# Patient Record
Sex: FEMALE | Race: WHITE | NOT HISPANIC OR LATINO | Employment: OTHER | URBAN - METROPOLITAN AREA
[De-identification: names, ages, dates, MRNs, and addresses within clinical notes are randomized per-mention and may not be internally consistent; named-entity substitution may affect disease eponyms.]

---

## 2021-11-29 ENCOUNTER — OFFICE VISIT (OUTPATIENT)
Dept: GASTROENTEROLOGY | Facility: CLINIC | Age: 81
End: 2021-11-29

## 2021-11-29 ENCOUNTER — LAB (OUTPATIENT)
Dept: LAB | Facility: HOSPITAL | Age: 81
End: 2021-11-29

## 2021-11-29 VITALS
WEIGHT: 114.2 LBS | HEART RATE: 79 BPM | TEMPERATURE: 97.8 F | DIASTOLIC BLOOD PRESSURE: 84 MMHG | SYSTOLIC BLOOD PRESSURE: 137 MMHG

## 2021-11-29 DIAGNOSIS — R11.0 NAUSEA: ICD-10-CM

## 2021-11-29 DIAGNOSIS — R10.10 PAIN OF UPPER ABDOMEN: ICD-10-CM

## 2021-11-29 DIAGNOSIS — R74.8 ELEVATED LIVER ENZYMES: ICD-10-CM

## 2021-11-29 DIAGNOSIS — R74.8 ELEVATED LIVER ENZYMES: Primary | ICD-10-CM

## 2021-11-29 DIAGNOSIS — R17 JAUNDICE: ICD-10-CM

## 2021-11-29 DIAGNOSIS — R50.9 FEVER, UNSPECIFIED FEVER CAUSE: ICD-10-CM

## 2021-11-29 PROCEDURE — 99204 OFFICE O/P NEW MOD 45 MIN: CPT | Performed by: NURSE PRACTITIONER

## 2021-11-29 RX ORDER — LEVOTHYROXINE SODIUM 88 UG/1
1 TABLET ORAL DAILY
COMMUNITY
Start: 2021-11-22

## 2021-11-29 RX ORDER — CLOPIDOGREL BISULFATE 75 MG/1
1 TABLET ORAL DAILY
COMMUNITY
Start: 2021-10-20

## 2021-11-29 RX ORDER — LISINOPRIL 5 MG/1
1 TABLET ORAL DAILY
COMMUNITY
Start: 2021-08-28

## 2021-11-29 RX ORDER — ASPIRIN 81 MG/1
81 TABLET ORAL DAILY
COMMUNITY

## 2021-11-29 NOTE — PROGRESS NOTES
GASTROENTEROLOGY OFFICE NOTE    Salma House  [id number]  1940    CARE TEAM  Patient Care Team:  Pauline Fuentes DO as PCP - General (Family Medicine)    Referring Provider: No ref. provider found    Chief Complaint   Patient presents with   • Abnormal Lab     Elevated Liver Transaminase levels         HISTORY OF PRESENT ILLNESS:   Salma House is a 81 y.o. female who presents to the clinic today for evaluation regarding elevated liver enzymes. She was referred by her PCP, Dr. Fuentes.Due to pain across the upper abdomen and pressure as well as nausea and difficulty breathing labs were ordered.  She had  negative Covid and influenza tests on 11/22/2021. 11/22/2021 H. pylori breath test negative; B12 greater than 1000; free T4 elevated at 2.50; TSH elevated at 13.70; CBC with normal white blood cell count, hemoglobin elevated at 15.4; hematocrit normal and platelet count normal 393; CMP with elevated bilirubin of 3.4, AST elevated at 540 ALT elevated at 325 and alkaline phosphatase elevated at 334.   She was informed to stop taking atorvastatin and avoid acetaminophen.  She quit atorvastatin approximately 7 days ago.  It was documented that amylase and lipase were added on to her labs and CT scan of the abdomen was ordered.  She reports she has received a voicemail to schedule the CT scan and while waiting in the office, she scheduled it for 12/3 at 7:00 at [facility].    She reports intermittent episodes of the right side abdominal pain and right upper quadrant pain that has recently become worse.  She reports pain radiates from the right side to the epigastric area to the left upper quadrant and is aggravated by eating.  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  She reports chills in the evenings.  She has nausea.  She reports recently noticing her urine was dark in color and has also experienced feeling short of air and cough with possible  abdominal swelling.  She reports when she was previously on atorvastatin it was discontinued due to an issue but she did not inform me of the details of this.     Past Medical History:   Diagnosis Date   • Graves disease    • Hypertension         Past Surgical History:   Procedure Laterality Date   • BLADDER SUSPENSION     • CHOLECYSTECTOMY     • COLONOSCOPY     • FOOT SURGERY Left    • HYSTERECTOMY     • TONSILLECTOMY     • TOTAL HIP ARTHROPLASTY Left         Current Outpatient Medications on File Prior to Visit   Medication Sig   • aspirin 81 MG EC tablet Take 81 mg by mouth Daily.   • clopidogrel (PLAVIX) 75 MG tablet Take 1 tablet by mouth Daily.   • levothyroxine (SYNTHROID, LEVOTHROID) 88 MCG tablet Take 1 tablet by mouth Daily.   • lisinopril (PRINIVIL,ZESTRIL) 5 MG tablet Take 1 tablet by mouth Daily.     No current facility-administered medications on file prior to visit.       No Known Allergies    Family History   Problem Relation Age of Onset   • Heart disease Mother    • Heart disease Father    • Colon polyps Neg Hx    • Colon cancer Neg Hx        Social History     Socioeconomic History   • Marital status: Unknown   Tobacco Use   • Smoking status: Current Every Day Smoker     Packs/day: 0.50   • Smokeless tobacco: Never Used   Vaping Use   • Vaping Use: Never used   Substance and Sexual Activity   • Alcohol use: Never   • Drug use: Never   • Sexual activity: Defer       PHYSICAL EXAM   /84 (BP Location: Left arm, Patient Position: Sitting, Cuff Size: Adult)   Pulse 79   Temp 97.8 °F (36.6 °C) (Temporal)   Wt 51.8 kg (114 lb 3.2 oz)   Physical Exam  Constitutional:       General: She is not in acute distress.  HENT:      Head: Normocephalic and atraumatic. No contusion.      Right Ear: External ear normal.      Left Ear: External ear normal.   Eyes:      General: Lids are normal. Scleral icterus present.         Right eye: No discharge.         Left eye: No discharge.      Extraocular  Movements: Extraocular movements intact.   Neck:      Trachea: Trachea normal.      Comments: No visible mass  No visible adenopathy  Cardiovascular:      Rate and Rhythm: Normal rate.   Pulmonary:      Effort: No respiratory distress.      Comments: Symmetrical expansion    Abdominal:      Palpations: Abdomen is soft. There is no mass.      Tenderness: There is abdominal tenderness in the right upper quadrant, epigastric area and left upper quadrant.   Musculoskeletal:      Right lower leg: No edema.      Left lower leg: No edema.      Comments: Symmetrical movement of upper extremities  Symmetrical movement of lower extremities  No visible deformities   Skin:     General: Skin is warm and dry.      Coloration: Skin is jaundiced.   Neurological:      General: No focal deficit present.      Mental Status: She is alert and oriented to person, place, and time.   Psychiatric:         Mood and Affect: Mood normal.         Behavior: Behavior normal.         Thought Content: Thought content normal.         Results Review:  negative Covid and influenza tests on 11/22/2021. 11/22/2021 H. pylori breath test negative; B12 greater than 1000; free T4 elevated at 2.50; TSH elevated at 13.70; CBC with normal white blood cell count, hemoglobin elevated at 15.4; hematocrit normal and platelet count normal 393; CMP with elevated bilirubin of 3.4, AST elevated at 540 ALT elevated at 325 and alkaline phosphatase elevated at 334.        ASSESSMENT / PLAN  1. Elevated liver enzymes, abdominal pain and nausea.  -recommend stat US. I believe this is scheduled for tomorrow. She also scheduled CT at [facility] 12/3/2021 but will await US result and lab results to determine if she needs CT and/or possible MRCP or ERCP. Due to noting jaundice and possible fever in the evenings, I discussed hospital admission but she reported she needed some time before she could be admitted to the hospital. I recommend she start preparing for possible admission.  Will try to review lab results and US result tomorrow and discuss findings with the patient.   Atorvastatin was recently discontinued by her PCP. I also wanted to order AFP, INR and hepatitis B studies but I received an error message that her insurance may not cover these tests so they were canceled. If abnormal findings on labs or imaging, will consider ordering these tests in the future.   - Alpha - 1 - Antitrypsin Phenotype; Future  - STARLA With / DsDNA, RNP, Sjogrens A / B, Lam; Future  - Anti-microsomal Antibody; Future  - Anti-Smooth Muscle Antibody Titer; Future  - CBC (No Diff); Future  - Comprehensive Metabolic Panel; Future  - Ferritin; Future  - HCV Antibody Rfx To Qnt PCR; Future  - Hemochromatosis Mutation; Future  - Hepatitis A Antibody, Total; Future  - Mitochondrial Antibodies, M2; Future  - Iron Profile; Future  - IgG; Future  - US Liver; Future    2. Jaundice  -see above  - US Liver; Future    3. Fever, unspecified fever cause  -I am concerned about possible cholangitis and discussed with the patient the possible need for inpatient care and treatment but she reported she needed some time before possible hospital admission. We discussed if any worsening symptoms, she needs to go to an ED. She demonstrated understanding.     Salma House  reports that she has been smoking. She has been smoking about 0.50 packs per day. She has never used smokeless tobacco..   I advised her to quit.  I spent 3  minutes counseling the patient.        I will plan to call the patient with US and lab results to determine next step.   Milagros Sylvester, APRN  11/29/2021

## 2021-11-30 ENCOUNTER — HOSPITAL ENCOUNTER (OUTPATIENT)
Dept: ULTRASOUND IMAGING | Facility: HOSPITAL | Age: 81
Discharge: HOME | End: 2021-11-30
Admitting: NURSE PRACTITIONER

## 2021-11-30 DIAGNOSIS — R74.8 ELEVATED LIVER ENZYMES: ICD-10-CM

## 2021-11-30 DIAGNOSIS — R17 JAUNDICE: ICD-10-CM

## 2021-11-30 PROCEDURE — 76705 ECHO EXAM OF ABDOMEN: CPT

## 2021-12-01 ENCOUNTER — TELEPHONE (OUTPATIENT)
Dept: GASTROENTEROLOGY | Facility: CLINIC | Age: 81
End: 2021-12-01

## 2021-12-01 DIAGNOSIS — R11.0 NAUSEA: ICD-10-CM

## 2021-12-01 DIAGNOSIS — K83.8 COMMON BILE DUCT DILATION: ICD-10-CM

## 2021-12-01 DIAGNOSIS — R10.10 PAIN OF UPPER ABDOMEN: ICD-10-CM

## 2021-12-01 DIAGNOSIS — R17 JAUNDICE: ICD-10-CM

## 2021-12-01 DIAGNOSIS — R74.8 ELEVATED LIVER ENZYMES: Primary | ICD-10-CM

## 2021-12-02 ENCOUNTER — TELEPHONE (OUTPATIENT)
Dept: GASTROENTEROLOGY | Facility: CLINIC | Age: 81
End: 2021-12-02

## 2021-12-02 LAB
A1AT PHENOTYP SERPL IFE: ABNORMAL
A1AT SERPL-MCNC: 225 MG/DL (ref 101–187)
ACTIN IGG SERPL-ACNC: 51 UNITS (ref 0–19)
ALBUMIN SERPL-MCNC: 4 G/DL (ref 3.7–4.7)
ALBUMIN/GLOB SERPL: 1.1 {RATIO} (ref 1.2–2.2)
ALP SERPL-CCNC: 318 IU/L (ref 44–121)
ALT SERPL-CCNC: 214 IU/L (ref 0–32)
ANA SER QL: NEGATIVE
AST SERPL-CCNC: 327 IU/L (ref 0–40)
BILIRUB SERPL-MCNC: 5.4 MG/DL (ref 0–1.2)
BUN SERPL-MCNC: 5 MG/DL (ref 8–27)
BUN/CREAT SERPL: 5 (ref 12–28)
CALCIUM SERPL-MCNC: 9.1 MG/DL (ref 8.7–10.3)
CHLORIDE SERPL-SCNC: 96 MMOL/L (ref 96–106)
CO2 SERPL-SCNC: 22 MMOL/L (ref 20–29)
CREAT SERPL-MCNC: 0.94 MG/DL (ref 0.57–1)
ERYTHROCYTE [DISTWIDTH] IN BLOOD BY AUTOMATED COUNT: 12.8 % (ref 11.7–15.4)
FERRITIN SERPL-MCNC: 616 NG/ML (ref 15–150)
GLOBULIN SER CALC-MCNC: 3.6 G/DL (ref 1.5–4.5)
GLUCOSE SERPL-MCNC: 83 MG/DL (ref 65–99)
HAV AB SER QL IA: POSITIVE
HCT VFR BLD AUTO: 43.1 % (ref 34–46.6)
HCV AB S/CO SERPL IA: <0.1 S/CO RATIO (ref 0–0.9)
HCV AB SERPL QL IA: NORMAL
HFE GENE MUT ANL BLD/T: NORMAL
HGB BLD-MCNC: 14.9 G/DL (ref 11.1–15.9)
IGG SERPL-MCNC: 1498 MG/DL (ref 586–1602)
IRON SATN MFR SERPL: 58 % (ref 15–55)
IRON SERPL-MCNC: 178 UG/DL (ref 27–139)
LKM-1 AB SER-ACNC: 1 UNITS (ref 0–20)
MCH RBC QN AUTO: 31.7 PG (ref 26.6–33)
MCHC RBC AUTO-ENTMCNC: 34.6 G/DL (ref 31.5–35.7)
MCV RBC AUTO: 92 FL (ref 79–97)
MITOCHONDRIA M2 IGG SER-ACNC: <20 UNITS (ref 0–20)
PLATELET # BLD AUTO: 376 X10E3/UL (ref 150–450)
POTASSIUM SERPL-SCNC: 3.6 MMOL/L (ref 3.5–5.2)
PROT SERPL-MCNC: 7.6 G/DL (ref 6–8.5)
RBC # BLD AUTO: 4.7 X10E6/UL (ref 3.77–5.28)
SODIUM SERPL-SCNC: 135 MMOL/L (ref 134–144)
TIBC SERPL-MCNC: 309 UG/DL (ref 250–450)
UIBC SERPL-MCNC: 131 UG/DL (ref 118–369)
WBC # BLD AUTO: 6.9 X10E3/UL (ref 3.4–10.8)

## 2021-12-02 NOTE — TELEPHONE ENCOUNTER
Patient called with complaints of worsening abdominal pain in the upper abdomen and radiates around to her back.  She is currently being evaluated for elevated liver enzymes and hyperbilirubinemia.  Bilirubin noted to have worsened and a 1 week timeframe from 3.4 to 5.4, MRCP is planned but next available appointment is not until Monday 12/6.  I discussed with the patient that with her complaints of pain increasing bilirubin she may have a blockage in her duct and this needs to be evaluated.  Until then, the etiology of her pain is not clear.  Currently, she states that her pain is tolerable but it seems to be worsening.  I recommended that she come to the ED if the pain continues to worsen.  She is agreeable.

## 2021-12-02 NOTE — TELEPHONE ENCOUNTER
Called and notified patient of the results as well as Milagros Sylvester's , Recommendations. Per the patient there is no reason she cant have a MRCP and is okay with moving forward with the imaging. The patient was also instructed to call her PCP office and confirm if they still want her to have the CT scheduled for tomorrow. Notified patient we will call her back today with a day and time for the MRCP.

## 2021-12-02 NOTE — TELEPHONE ENCOUNTER
Called patient back and notified her she is scheduled for 12/06/2021 arrival time at 11:15AM. Notified the patient if we can get her in for a earlier appointment we will contact her with a new day and time. Patient noted she is having moderate abdominal pain that she feels has become worse since her office visit , unable to eat anything, has nausea, feels very weak, and has only had a cup of coffee/glass of tea. Transferred patient to [facility] to further discuss her symptoms and recommendations.

## 2021-12-03 DIAGNOSIS — E80.6 HYPERBILIRUBINEMIA: Primary | ICD-10-CM

## 2021-12-03 DIAGNOSIS — R94.5 ABNORMAL RESULTS OF LIVER FUNCTION STUDIES: ICD-10-CM

## 2021-12-03 DIAGNOSIS — Z11.59 ENCOUNTER FOR SCREENING FOR OTHER VIRAL DISEASES: ICD-10-CM

## 2021-12-03 NOTE — TELEPHONE ENCOUNTER
Called [facility] and requested a copy of the CT report. Confirmed they are faxing it.

## 2021-12-03 NOTE — TELEPHONE ENCOUNTER
Called [facility] a second time as we still have not received the CT report and requested they refax it. Medical records confirmed they will be faxing it again.

## 2021-12-06 ENCOUNTER — HOSPITAL ENCOUNTER (OUTPATIENT)
Dept: MRI IMAGING | Facility: HOSPITAL | Age: 81
Discharge: HOME | End: 2021-12-06

## 2021-12-06 ENCOUNTER — LAB (OUTPATIENT)
Dept: LAB | Facility: HOSPITAL | Age: 81
End: 2021-12-06

## 2021-12-06 ENCOUNTER — TELEPHONE (OUTPATIENT)
Dept: GASTROENTEROLOGY | Facility: CLINIC | Age: 81
End: 2021-12-06

## 2021-12-06 DIAGNOSIS — R17 JAUNDICE: ICD-10-CM

## 2021-12-06 DIAGNOSIS — K83.8 COMMON BILE DUCT DILATION: ICD-10-CM

## 2021-12-06 DIAGNOSIS — R74.8 ELEVATED LIVER ENZYMES: ICD-10-CM

## 2021-12-06 DIAGNOSIS — R10.10 PAIN OF UPPER ABDOMEN: ICD-10-CM

## 2021-12-06 DIAGNOSIS — R11.0 NAUSEA: ICD-10-CM

## 2021-12-06 PROCEDURE — 85610 PROTHROMBIN TIME: CPT | Performed by: NURSE PRACTITIONER

## 2021-12-06 PROCEDURE — 74181 MRI ABDOMEN W/O CONTRAST: CPT

## 2021-12-07 DIAGNOSIS — R11.2 NAUSEA AND VOMITING, INTRACTABILITY OF VOMITING NOT SPECIFIED, UNSPECIFIED VOMITING TYPE: ICD-10-CM

## 2021-12-07 DIAGNOSIS — B16.9 HEPATITIS, ACUTE TYPE B: Primary | ICD-10-CM

## 2021-12-07 RX ORDER — ONDANSETRON 4 MG/1
4 TABLET, FILM COATED ORAL EVERY 8 HOURS PRN
Qty: 21 TABLET | Refills: 3 | Status: SHIPPED | OUTPATIENT
Start: 2021-12-07

## 2021-12-15 ENCOUNTER — LAB (OUTPATIENT)
Dept: LAB | Facility: HOSPITAL | Age: 81
End: 2021-12-15

## 2021-12-15 ENCOUNTER — OFFICE VISIT (OUTPATIENT)
Dept: GASTROENTEROLOGY | Facility: CLINIC | Age: 81
End: 2021-12-15

## 2021-12-15 VITALS
DIASTOLIC BLOOD PRESSURE: 70 MMHG | HEART RATE: 81 BPM | BODY MASS INDEX: 20.98 KG/M2 | WEIGHT: 114 LBS | SYSTOLIC BLOOD PRESSURE: 101 MMHG | HEIGHT: 62 IN

## 2021-12-15 DIAGNOSIS — Z78.9 IMMUNE TO HEPATITIS A: ICD-10-CM

## 2021-12-15 DIAGNOSIS — R12 HEARTBURN: ICD-10-CM

## 2021-12-15 DIAGNOSIS — Z72.89 OTHER PROBLEMS RELATED TO LIFESTYLE: ICD-10-CM

## 2021-12-15 DIAGNOSIS — R11.2 NAUSEA AND VOMITING, INTRACTABILITY OF VOMITING NOT SPECIFIED, UNSPECIFIED VOMITING TYPE: ICD-10-CM

## 2021-12-15 DIAGNOSIS — B16.9 HEPATITIS, ACUTE TYPE B: ICD-10-CM

## 2021-12-15 DIAGNOSIS — R79.89 ELEVATED FERRITIN: ICD-10-CM

## 2021-12-15 DIAGNOSIS — R74.8 ELEVATED LIVER ENZYMES: Primary | ICD-10-CM

## 2021-12-15 DIAGNOSIS — R93.5 ABNORMAL ABDOMINAL CT SCAN: ICD-10-CM

## 2021-12-15 LAB
INR PPP: 1.25 (ref 0.85–1.16)
PROTHROMBIN TIME: 15.3 SECONDS (ref 11.4–14.4)

## 2021-12-15 PROCEDURE — 85610 PROTHROMBIN TIME: CPT

## 2021-12-15 PROCEDURE — 87517 HEPATITIS B DNA QUANT: CPT | Performed by: NURSE PRACTITIONER

## 2021-12-15 PROCEDURE — 86707 HEPATITIS BE ANTIBODY: CPT

## 2021-12-15 PROCEDURE — 87350 HEPATITIS BE AG IA: CPT

## 2021-12-15 PROCEDURE — 82728 ASSAY OF FERRITIN: CPT

## 2021-12-15 PROCEDURE — 85027 COMPLETE CBC AUTOMATED: CPT

## 2021-12-15 PROCEDURE — 80053 COMPREHEN METABOLIC PANEL: CPT

## 2021-12-15 PROCEDURE — 99214 OFFICE O/P EST MOD 30 MIN: CPT | Performed by: NURSE PRACTITIONER

## 2021-12-15 PROCEDURE — 36415 COLL VENOUS BLD VENIPUNCTURE: CPT

## 2021-12-15 PROCEDURE — G0432 EIA HIV-1/HIV-2 SCREEN: HCPCS

## 2021-12-15 RX ORDER — PANTOPRAZOLE SODIUM 20 MG/1
20 TABLET, DELAYED RELEASE ORAL DAILY
Qty: 90 TABLET | Refills: 3 | Status: SHIPPED | OUTPATIENT
Start: 2021-12-15

## 2021-12-15 NOTE — PROGRESS NOTES
GASTROENTEROLOGY OFFICE NOTE    Salma House  [id number]  1940    CARE TEAM  Patient Care Team:  Pauline Fuentes DO as PCP - General (Family Medicine)    Referring Provider: No ref. provider found    Chief Complaint   Patient presents with   • Follow-up     elevated LFT's         HISTORY OF PRESENT ILLNESS:   Salma House is a 81 y.o. female who presents to the clinic today for follow up regarding elevated liver enzymes suspected to be due to acute hepatitis B. After her last office visit on 11/29/2021 she had labs completed as below.  Hepatitis A antibody indicated immunity to hepatitis A; ferritin elevated, hereditary hemochromatosis with one mutation C282Y, likely unaffected carrier; iron profile with elevated iron and iron saturation; CMP with elevated bilirubin 5.4, elevated alkaline phosphatase 318, elevated  and elevated .  Anti-smooth muscle antibody titer elevated at 51; alpha-1 antitrypsin elevated at 225 with phenotype MM  The following tests were normal or negative mitochondrial antibodies; hepatitis C antibody; CBC; antimicrosomal antibody; STARLA  11/30/2021 ultrasound of the liver revealed homogenous appearance of the liver parenchyma without focal liver lesion; gallbladder surgically absent; common bile duct measures 8 mm in diameter of likely mild post cholecystectomy ectasia but without echogenic foci associated; right kidney cyst.  12/3/2021 CT scan of the abdomen at [facility] revealed to pleural-based noncalcified nodule in the left lower lobe, noncalcified nodule in the lingula, stable compared to previous exam and pleural-based nodular nodule in the anterior right middle lobe stable in comparison to the previous exam; mixed density lesion in the right middle lobe that is new from previous exam that may be inflammatory or postinflammatory in nature with recommendation to have CT of the chest to evaluate for additional pulmonary abnormalities;  fatty infiltration of the liver; hypodense 8 mm lesion in the right kidney too small to characterize; left kidney there are a few 9 mm or less hypodense lesions likely cysts but too small to characterize with recommended 3 to 6-month follow-up.  12/6/2021 CMP results as below with elevated bilirubin of 7, elevated alkaline phosphatase of 321, elevated AST of 362 and elevated but improved ALT of 184.  Hepatitis B core antibody total positive; hepatitis B surface antibody nonreactive hepatitis B surface antigen positive; INR 1.19.  12/6/2021 MRCP at [facility] revealed minimal ectasia of the extrahepatic common bile duct measuring up to 9 mm likely within normal physiologic limits given age and postcholecystectomy state, no evidence of intrahepatic biliary ductal dilatation, filling defects concerning for stone or obstructing mass; the pancreatic duct is partially imaged, normal in appearance with overall impression of essentially normal MRCP.  When she was notified via telephone that labs revealed likely acute hepatitis B she denied sexual intercourse for at least 20 years. She reported she has a niece that is living with her with current IV drug abuse. The patient reported she may have been accidentally been stuck by a needle when cleaning around the house and/or exposed to blood cleaning bathrooms or other surfaces.  She reports she is trying to get her niece to go to rehab but has not been able to do so.  The patient denied current and past history of intranasal and IV drug abuse.  She reports her niece has yet to be tested for hepatitis B.  She reports she feels tired, weak and continues to have nausea.  Zofran previously prescribed but she has not taken Zofran due to reading potential side effect of headache.  She reports a sensation of feeling tight around the abdomen.  Laying down helps symptoms.  He reports her bowel movements are light tan in color.  She has a bowel movement daily to twice daily.  She is  "trying to eat but does not have a great appetite at this time.  She reports possible regurgitation or vomiting at times.  She has history of cough that has recently improved.    Past Medical History:   Diagnosis Date   • Graves disease    • Hypertension         Past Surgical History:   Procedure Laterality Date   • BLADDER SUSPENSION     • CHOLECYSTECTOMY     • COLONOSCOPY     • FOOT SURGERY Left    • HYSTERECTOMY     • TONSILLECTOMY     • TOTAL HIP ARTHROPLASTY Left         Current Outpatient Medications on File Prior to Visit   Medication Sig   • clopidogrel (PLAVIX) 75 MG tablet Take 1 tablet by mouth Daily.   • levothyroxine (SYNTHROID, LEVOTHROID) 88 MCG tablet Take 1 tablet by mouth Daily.   • aspirin 81 MG EC tablet Take 81 mg by mouth Daily.   • lisinopril (PRINIVIL,ZESTRIL) 5 MG tablet Take 1 tablet by mouth Daily.   • ondansetron (Zofran) 4 MG tablet Take 1 tablet by mouth Every 8 (Eight) Hours As Needed for Nausea or Vomiting.     No current facility-administered medications on file prior to visit.       No Known Allergies    Family History   Problem Relation Age of Onset   • Heart disease Mother    • Heart disease Father    • Colon polyps Neg Hx    • Colon cancer Neg Hx        Social History     Socioeconomic History   • Marital status:    Tobacco Use   • Smoking status: Current Every Day Smoker     Packs/day: 0.50   • Smokeless tobacco: Never Used   Vaping Use   • Vaping Use: Never used   Substance and Sexual Activity   • Alcohol use: Never   • Drug use: Never   • Sexual activity: Defer       PHYSICAL EXAM   /70   Pulse 81   Ht 157.5 cm (62\")   Wt 51.7 kg (114 lb)   BMI 20.85 kg/m²   Physical Exam  Constitutional:       General: She is not in acute distress.     Appearance: She is ill-appearing.   HENT:      Head: Normocephalic and atraumatic. No contusion.      Right Ear: External ear normal.      Left Ear: External ear normal.   Eyes:      General: Lids are normal. Scleral icterus " present.         Right eye: No discharge.         Left eye: No discharge.      Extraocular Movements: Extraocular movements intact.   Neck:      Trachea: Trachea normal.      Comments: No visible mass  No visible adenopathy  Cardiovascular:      Rate and Rhythm: Normal rate.      Heart sounds: Normal heart sounds.   Pulmonary:      Effort: No respiratory distress.      Breath sounds: Normal breath sounds.      Comments: Symmetrical expansion    Abdominal:      Palpations: Abdomen is soft. There is no mass.      Tenderness: There is no abdominal tenderness.   Musculoskeletal:      Right lower leg: No edema.      Left lower leg: No edema.      Comments: Symmetrical movement of upper extremities  Symmetrical movement of lower extremities  No visible deformities   Skin:     General: Skin is warm and dry.      Coloration: Skin is jaundiced.      Comments: Possible improvement in jaundice noted   Neurological:      General: No focal deficit present.      Mental Status: She is alert and oriented to person, place, and time.   Psychiatric:         Mood and Affect: Mood normal.         Behavior: Behavior normal.         Thought Content: Thought content normal.     Results Review:    CMP    CMP 11/29/21 12/6/21   Glucose 83 84   BUN 5 (A) 4 (A)   Creatinine 0.94 0.98   eGFR Non African Am 57 (A) 55 (A)   eGFR African Am 66 66   Sodium 135 135 (A)   Potassium 3.6 3.8   Chloride 96 98   Calcium 9.1 9.0   Total Protein 7.6 7.2   Albumin 4.0 3.70   Globulin 3.6 3.5   Total Bilirubin 5.4 (A) 7.0 (A)   Alkaline Phosphatase 318 (A) 321 (A)   AST (SGOT) 327 (A) 362 (A)   ALT (SGPT) 214 (A) 184 (A)   (A) Abnormal value       Comments are available for some flowsheets but are not being displayed.           CBC    CBC 11/29/21   WBC 6.9   RBC 4.70   Hemoglobin 14.9   Hematocrit 43.1   MCV 92   MCH 31.7   MCHC 34.6   RDW 12.8   Platelets 376             ASSESSMENT / PLAN  1. Elevated liver enzymes  - likely due to acute hepatitis B. See  below.     2. Hepatitis, acute type B  - Previously discussed recommendations to avoid sexual intercourse and avoid sharing personal hygiene items such as razor blades, nail clippers, tweezers, toothbrush.  Recommend her niece be tested for hepatitis B but she reported today that her niece has yet to be tested for hepatitis B.  Recheck labs as below.  - CBC (No Diff); Future  - Comprehensive Metabolic Panel; Future  - Protime-INR; Future  - Ferritin; Future  - Hepatitis B DNA, Quantitative, PCR; Future  - HIV-1 & HIV-2 Antibodies; Future  - Hepatitis B E Antigen; Future  - Hepatitis B E Antibody; Future  - Hepatitis D Antigen; Future    3. Other problems related to lifestyle   Due to diagnosis of HBV, recommend HIV screening and this was the diagnosis code that her insurance would accept for HIV testing.   - HIV-1 & HIV-2 Antibodies; Future    4. Nausea and vomiting, intractability of vomiting not specified, unspecified vomiting type and abdominal discomfort  - Suspect HBV playing a role in nausea and vomiting and abdominal discomfort  -Recommend she try half to 1 Zofran as needed for nausea.  Start pantoprazole 20 mg once daily, try to separate from Plavix for 12 hours.  5. Heartburn  - pantoprazole (PROTONIX) 20 MG EC tablet; Take 1 tablet by mouth Daily. 30 minutes prior to evening meal  Dispense: 90 tablet; Refill: 3    6. Immune to hepatitis A    7. Elevated ferritin  -recheck, possibly elevated due to HBV infection    8. Abnormal abdominal CT scan  Lung nodule and new lung lesion-she has appointment scheduled for further evaluation  -liver with fatty infiltration, suspect inflammation from HBV contributing.   -kidney cysts with recommendation to repeat in 3 to 6 months. Her PCP should order repeat CT but I will try to continue to follow.   US with CBD of 8 mm and MRCP with CBD of 9 mm likely due to age and postcholecystectomy state    Return in about 2 weeks (around 12/29/2021).    Milagros Sylvester,  APRN  12/15/2021    ADDENDUM LESLYE Sylvester, APRN 1/3/2022: some documentation from  admission reviewed. I believe she was admitted from 12/28/2021 to 1/1/2022. 1/1/2022 CBC with normal white blood cell count, normal hemoglobin, hematocrit low 32.5 and platelet count normal.  CMP with low sodium of 131, CO2 low at 20, calcium low at 8.1, total protein low at 4.9, albumin low at 2.5, AST elevated 480, ALT elevated 270, alkaline phosphatase elevated 188 and total bilirubin level of 21.9.  eGFR low at 60.  12/29/2021 INR elevated at 1.5.12/28/2021 Ultrasound of the liver at the [facility] revealed dilatation of the extrahepatic bile duct likely related to postcholecystectomy, kidney hepatic vasculature with appropriate fluid directionality.  Reviewed gastroenterology consultation progress note from 12/30/2021 revealed elevated liver enzymes likely due to acute hepatitis B with protracted severe course of acute hepatitis B, not in acute liver failure, no cirrhosis on imaging and considering 4 weeks of elevated liver enzymes she meets criteria for treatment with creatinine clearance, optimal dose would be entecavir 0.5 mg daily.  I was documented she had outpatient follow-up with Dr. Jason on 1/10/2021.  ADDENDUM LESLYE Sylvester, APRN 2/14/2022: received notification that hepatitis D was not ran due to not receiving the specimen

## 2021-12-16 LAB — HIV1+2 AB SER QL: NORMAL

## 2021-12-17 DIAGNOSIS — B16.9 HEPATITIS, ACUTE TYPE B: Primary | ICD-10-CM

## 2021-12-17 LAB
ALBUMIN SERPL-MCNC: 3.3 G/DL (ref 3.7–4.7)
ALBUMIN/GLOB SERPL: 1.1 {RATIO} (ref 1.2–2.2)
ALP SERPL-CCNC: 291 IU/L (ref 44–121)
ALT SERPL-CCNC: 166 IU/L (ref 0–32)
AST SERPL-CCNC: 466 IU/L (ref 0–40)
BILIRUB SERPL-MCNC: 11.1 MG/DL (ref 0–1.2)
BUN SERPL-MCNC: 7 MG/DL (ref 8–27)
BUN/CREAT SERPL: 6 (ref 12–28)
CALCIUM SERPL-MCNC: 8.9 MG/DL (ref 8.7–10.3)
CHLORIDE SERPL-SCNC: 98 MMOL/L (ref 96–106)
CO2 SERPL-SCNC: 20 MMOL/L (ref 20–29)
CREAT SERPL-MCNC: 1.11 MG/DL (ref 0.57–1)
ERYTHROCYTE [DISTWIDTH] IN BLOOD BY AUTOMATED COUNT: 16.5 % (ref 11.7–15.4)
FERRITIN SERPL-MCNC: 443 NG/ML (ref 15–150)
GLOBULIN SER CALC-MCNC: 3.1 G/DL (ref 1.5–4.5)
GLUCOSE SERPL-MCNC: 71 MG/DL (ref 65–99)
HBV E AB SERPL QL IA: NEGATIVE
HBV E AG SERPL QL IA: POSITIVE
HCT VFR BLD AUTO: 40.7 % (ref 34–46.6)
HGB BLD-MCNC: 13.9 G/DL (ref 11.1–15.9)
MCH RBC QN AUTO: 31.1 PG (ref 26.6–33)
MCHC RBC AUTO-ENTMCNC: 34.2 G/DL (ref 31.5–35.7)
MCV RBC AUTO: 91 FL (ref 79–97)
PLATELET # BLD AUTO: 300 X10E3/UL (ref 150–450)
POTASSIUM SERPL-SCNC: 3.8 MMOL/L (ref 3.5–5.2)
PROT SERPL-MCNC: 6.4 G/DL (ref 6–8.5)
RBC # BLD AUTO: 4.47 X10E6/UL (ref 3.77–5.28)
SODIUM SERPL-SCNC: 135 MMOL/L (ref 134–144)
WBC # BLD AUTO: 6.1 X10E3/UL (ref 3.4–10.8)

## 2021-12-17 NOTE — PROGRESS NOTES
Please let the patient know that bilirubin has increased to 11, alkaline phosphatase and ALT have improved but remain elevated and AST is increased. Her kidney function is abnormal. Recommend she drink 60 oz of water per day and avoid all NSAIDs (ibuprofen, motrin, advil, aleve, naproxen). HIV test was negative. I would like for her to have labs repeated in approximately 10 days. I will place the orders. Thanks!

## 2021-12-21 ENCOUNTER — TELEPHONE (OUTPATIENT)
Dept: GASTROENTEROLOGY | Facility: CLINIC | Age: 81
End: 2021-12-21

## 2021-12-21 NOTE — TELEPHONE ENCOUNTER
I called patient and let her know the results per Milagros GAMBOA. Patient verbalized understanding results and will have labs completed the week after [place]. Patient had no further questions.

## 2021-12-27 ENCOUNTER — LAB (OUTPATIENT)
Dept: LAB | Facility: HOSPITAL | Age: 81
End: 2021-12-27

## 2021-12-27 LAB
ALBUMIN SERPL-MCNC: 3.2 G/DL (ref 3.5–5.2)
ALBUMIN/GLOB SERPL: 1 G/DL
ALP SERPL-CCNC: 246 U/L (ref 39–117)
ALT SERPL W P-5'-P-CCNC: 316 U/L (ref 1–33)
ANION GAP SERPL CALCULATED.3IONS-SCNC: 11 MMOL/L (ref 5–15)
AST SERPL-CCNC: 638 U/L (ref 1–32)
BILIRUB SERPL-MCNC: 26.5 MG/DL (ref 0–1.2)
BUN SERPL-MCNC: 6 MG/DL (ref 8–23)
BUN/CREAT SERPL: 7.7 (ref 7–25)
CALCIUM SPEC-SCNC: 8.8 MG/DL (ref 8.6–10.5)
CHLORIDE SERPL-SCNC: 98 MMOL/L (ref 98–107)
CO2 SERPL-SCNC: 25 MMOL/L (ref 22–29)
CREAT SERPL-MCNC: 0.78 MG/DL (ref 0.57–1)
GFR SERPL CREATININE-BSD FRML MDRD: 71 ML/MIN/1.73
GLOBULIN UR ELPH-MCNC: 3.2 GM/DL
GLUCOSE SERPL-MCNC: 99 MG/DL (ref 65–99)
POTASSIUM SERPL-SCNC: 4 MMOL/L (ref 3.5–5.2)
PROT SERPL-MCNC: 6.4 G/DL (ref 6–8.5)
SODIUM SERPL-SCNC: 134 MMOL/L (ref 136–145)

## 2021-12-27 PROCEDURE — 80053 COMPREHEN METABOLIC PANEL: CPT | Performed by: NURSE PRACTITIONER

## 2021-12-27 PROCEDURE — 85610 PROTHROMBIN TIME: CPT | Performed by: NURSE PRACTITIONER

## 2021-12-28 DIAGNOSIS — B16.9 HEPATITIS, ACUTE TYPE B: Primary | ICD-10-CM

## 2021-12-28 RX ORDER — ENTECAVIR 0.5 MG/1
0.5 TABLET, FILM COATED ORAL DAILY
Qty: 90 TABLET | Refills: 3 | Status: SHIPPED | OUTPATIENT
Start: 2021-12-28

## 2021-12-28 NOTE — PROGRESS NOTES
I spoke to patient this morning regarding most recent lab results which revealed worsening LFTs and INR. She reports she feels weak and can't be up for long without needing to rest. I recommend she go to an ED for evaluation, possibly [facility] due to the hepatology program there but she can choose to go to any ED for evaluation. She reports she will likely wait until her granddaughter can take her. I prescribed entecavir 0.5 mg daily to try to start on an empty stomach but if she is admitted at a hospital she should follow recommendations from the admitting hospital. She demonstrated understanding. She has office appointment next week for follow up.

## 2022-01-05 ENCOUNTER — TELEPHONE (OUTPATIENT)
Dept: GASTROENTEROLOGY | Facility: CLINIC | Age: 82
End: 2022-01-05

## 2022-01-05 NOTE — TELEPHONE ENCOUNTER
I called patient and left voice message for her to return my phone call so we could reschedule her appointment per Milagros GAMBOA.

## 2022-01-10 ENCOUNTER — TELEPHONE (OUTPATIENT)
Dept: GASTROENTEROLOGY | Facility: CLINIC | Age: 82
End: 2022-01-10

## 2022-01-10 NOTE — TELEPHONE ENCOUNTER
I called patient and left a voice message for her to return my phone call so we can get her an appointment with Milagros GAMBOA.